# Patient Record
Sex: MALE | URBAN - METROPOLITAN AREA
[De-identification: names, ages, dates, MRNs, and addresses within clinical notes are randomized per-mention and may not be internally consistent; named-entity substitution may affect disease eponyms.]

---

## 2020-06-01 ENCOUNTER — APPOINTMENT (OUTPATIENT)
Age: 25
Setting detail: DERMATOLOGY
End: 2020-06-02

## 2020-06-01 DIAGNOSIS — L80 VITILIGO: ICD-10-CM

## 2020-06-01 PROCEDURE — OTHER TREATMENT REGIMEN: OTHER

## 2020-06-01 PROCEDURE — OTHER MEDICATION COUNSELING: OTHER

## 2020-06-01 PROCEDURE — OTHER PRESCRIPTION: OTHER

## 2020-06-01 PROCEDURE — OTHER COUNSELING: OTHER

## 2020-06-01 PROCEDURE — OTHER ADDITIONAL NOTES: OTHER

## 2020-06-01 PROCEDURE — 99202 OFFICE O/P NEW SF 15 MIN: CPT

## 2020-06-01 RX ORDER — BETAMETHASONE VALERATE 1 MG/G
OINTMENT TOPICAL
Qty: 1 | Refills: 0 | Status: ERX | COMMUNITY
Start: 2020-06-01

## 2020-06-01 RX ORDER — BETAMETHASONE DIPROPIONATE 0.5 MG/G
OINTMENT TOPICAL
Qty: 1 | Refills: 0 | Status: ERX | COMMUNITY
Start: 2020-06-01

## 2020-06-01 ASSESSMENT — LOCATION ZONE DERM
LOCATION ZONE: LIP
LOCATION ZONE: HAND
LOCATION ZONE: FACE

## 2020-06-01 ASSESSMENT — LOCATION DETAILED DESCRIPTION DERM
LOCATION DETAILED: RIGHT UPPER CUTANEOUS LIP
LOCATION DETAILED: RIGHT INFERIOR LATERAL MALAR CHEEK
LOCATION DETAILED: RIGHT RADIAL DORSAL HAND
LOCATION DETAILED: RIGHT SUPERIOR CENTRAL MALAR CHEEK

## 2020-06-01 ASSESSMENT — LOCATION SIMPLE DESCRIPTION DERM
LOCATION SIMPLE: RIGHT CHEEK
LOCATION SIMPLE: RIGHT HAND
LOCATION SIMPLE: RIGHT LIP

## 2020-06-01 NOTE — PROCEDURE: TREATMENT REGIMEN
Detail Level: Detailed
Initiate Treatment: betamethasone dipropionate 0.05 % topical ointment \\nApply to affected areas of the cheek, hand, and neck twice per day for one week, then once per night for one week.\\n\\nbetamethasone valerate 0.1 % topical ointment \\nApply to affected areas around the eyes and groin twice per day for one week, then once per night for one week.

## 2020-06-16 ENCOUNTER — APPOINTMENT (OUTPATIENT)
Dept: URBAN - METROPOLITAN AREA CLINIC 236 | Age: 25
Setting detail: DERMATOLOGY
End: 2020-06-16

## 2020-06-16 DIAGNOSIS — L80 VITILIGO: ICD-10-CM

## 2020-06-16 PROCEDURE — OTHER ADDITIONAL NOTES: OTHER

## 2020-06-16 PROCEDURE — OTHER TREATMENT REGIMEN: OTHER

## 2020-06-16 PROCEDURE — OTHER MEDICATION COUNSELING: OTHER

## 2020-06-16 PROCEDURE — OTHER WOOD'S LAMP: OTHER

## 2020-06-16 PROCEDURE — OTHER PRESCRIPTION: OTHER

## 2020-06-16 PROCEDURE — OTHER COUNSELING: OTHER

## 2020-06-16 PROCEDURE — OTHER ORDER TESTS: OTHER

## 2020-06-16 PROCEDURE — 99213 OFFICE O/P EST LOW 20 MIN: CPT

## 2020-06-16 RX ORDER — CLOBETASOL PROPIONATE 0.5 MG/G
CREAM TOPICAL
Qty: 1 | Refills: 0 | Status: ERX | COMMUNITY
Start: 2020-06-16

## 2020-06-16 ASSESSMENT — LOCATION DETAILED DESCRIPTION DERM
LOCATION DETAILED: 3RD WEB SPACE RIGHT HAND
LOCATION DETAILED: RIGHT UPPER CUTANEOUS LIP
LOCATION DETAILED: RIGHT INFERIOR LATERAL MALAR CHEEK
LOCATION DETAILED: RIGHT SUPERIOR CENTRAL MALAR CHEEK
LOCATION DETAILED: RIGHT LOWER CUTANEOUS LIP
LOCATION DETAILED: RIGHT RADIAL DORSAL HAND

## 2020-06-16 ASSESSMENT — LOCATION SIMPLE DESCRIPTION DERM
LOCATION SIMPLE: RIGHT CHEEK
LOCATION SIMPLE: RIGHT LIP
LOCATION SIMPLE: RIGHT HAND

## 2020-06-16 ASSESSMENT — LOCATION ZONE DERM
LOCATION ZONE: HAND
LOCATION ZONE: LIP
LOCATION ZONE: FACE

## 2020-06-16 NOTE — PROCEDURE: ADDITIONAL NOTES
Additional Notes: Right cheek - 2.75cm x 2.75cm\\nRight canthus - 1.15cm x 1.15cm
Detail Level: Detailed

## 2020-06-16 NOTE — PROCEDURE: TREATMENT REGIMEN
Continue Regimen: betamethasone dipropionate 0.05 % topical ointment \\nApply to affected areas of the cheek, hand, and neck twice per day for one week, then once per night for one week.\\n\\nbetamethasone valerate 0.1 % topical ointment \\nApply to affected areas around the eyes and groin twice per day for one week, then once per night for one week.
Detail Level: Detailed
Initiate Treatment: clobetasol 0.05 % topical cream \\nApply to affected areas of the hands twice per day for two weeks. Repulse after 1 week off.

## 2020-07-14 ENCOUNTER — APPOINTMENT (OUTPATIENT)
Dept: URBAN - METROPOLITAN AREA CLINIC 236 | Age: 25
Setting detail: DERMATOLOGY
End: 2020-07-14

## 2020-07-14 VITALS — TEMPERATURE: 97.9 F

## 2020-07-14 DIAGNOSIS — L80 VITILIGO: ICD-10-CM

## 2020-07-14 PROCEDURE — OTHER LAB REPORTS REVIEWED: OTHER

## 2020-07-14 PROCEDURE — OTHER COUNSELING: OTHER

## 2020-07-14 PROCEDURE — OTHER TREATMENT REGIMEN: OTHER

## 2020-07-14 PROCEDURE — OTHER MEDICATION COUNSELING: OTHER

## 2020-07-14 PROCEDURE — 99213 OFFICE O/P EST LOW 20 MIN: CPT

## 2020-07-14 ASSESSMENT — LOCATION DETAILED DESCRIPTION DERM
LOCATION DETAILED: RIGHT SUPERIOR CENTRAL MALAR CHEEK
LOCATION DETAILED: RIGHT RADIAL DORSAL HAND
LOCATION DETAILED: RIGHT UPPER CUTANEOUS LIP
LOCATION DETAILED: RIGHT INFERIOR LATERAL MALAR CHEEK
LOCATION DETAILED: RIGHT LOWER CUTANEOUS LIP
LOCATION DETAILED: 3RD WEB SPACE RIGHT HAND

## 2020-07-14 ASSESSMENT — LOCATION SIMPLE DESCRIPTION DERM
LOCATION SIMPLE: RIGHT CHEEK
LOCATION SIMPLE: RIGHT HAND
LOCATION SIMPLE: RIGHT LIP

## 2020-07-14 ASSESSMENT — LOCATION ZONE DERM
LOCATION ZONE: LIP
LOCATION ZONE: FACE
LOCATION ZONE: HAND

## 2020-07-14 NOTE — PROCEDURE: TREATMENT REGIMEN
Detail Level: Detailed
Modify Regimen: clobetasol 0.05 % topical cream \\nApply to affected areas of the hands twice per day for two weeks. Repulse after 1 week off.\\n\\nbetamethasone dipropionate 0.05 % topical ointment \\nApply to affected areas of the cheek, hand, and neck twice per day for one week, then once per night for one week.\\n\\nbetamethasone valerate 0.1 % topical ointment \\nApply to affected areas around the eyes and groin twice per day for one week, then once per night for one week.\\n\\nTake off a week then Pulse medications for two weeks then take a one month break

## 2020-09-28 ENCOUNTER — APPOINTMENT (OUTPATIENT)
Dept: URBAN - METROPOLITAN AREA CLINIC 236 | Age: 25
Setting detail: DERMATOLOGY
End: 2020-09-29

## 2020-09-28 DIAGNOSIS — L80 VITILIGO: ICD-10-CM

## 2020-09-28 PROCEDURE — 99213 OFFICE O/P EST LOW 20 MIN: CPT

## 2020-09-28 PROCEDURE — OTHER MEDICATION COUNSELING: OTHER

## 2020-09-28 PROCEDURE — OTHER COUNSELING: OTHER

## 2020-09-28 PROCEDURE — OTHER WOOD'S LAMP: OTHER

## 2020-09-28 PROCEDURE — OTHER TREATMENT REGIMEN: OTHER

## 2020-09-28 ASSESSMENT — LOCATION DETAILED DESCRIPTION DERM
LOCATION DETAILED: RIGHT LOWER CUTANEOUS LIP
LOCATION DETAILED: RIGHT CENTRAL MALAR CHEEK
LOCATION DETAILED: RIGHT SUPERIOR CENTRAL MALAR CHEEK
LOCATION DETAILED: RIGHT RADIAL DORSAL HAND
LOCATION DETAILED: RIGHT UPPER CUTANEOUS LIP
LOCATION DETAILED: RIGHT INFERIOR LATERAL MALAR CHEEK
LOCATION DETAILED: 3RD WEB SPACE RIGHT HAND

## 2020-09-28 ASSESSMENT — LOCATION ZONE DERM
LOCATION ZONE: HAND
LOCATION ZONE: FACE
LOCATION ZONE: LIP

## 2020-09-28 ASSESSMENT — LOCATION SIMPLE DESCRIPTION DERM
LOCATION SIMPLE: RIGHT LIP
LOCATION SIMPLE: RIGHT HAND
LOCATION SIMPLE: RIGHT CHEEK

## 2020-09-28 NOTE — PROCEDURE: TREATMENT REGIMEN
Modify Regimen: clobetasol 0.05 % topical cream \\nApply to affected areas of the hands twice per day for two weeks. Repulse after 1 month off.\\n\\nbetamethasone dipropionate 0.05 % topical ointment \\nApply to affected areas of the cheek, hand, and neck twice per day for one week, then once per night for one week. Repulse after 1 month off.\\n\\nbetamethasone valerate 0.1 % topical ointment \\nApply to affected areas around the eyes and groin twice per day for one week, then once per night for one week. Repulse after 1 month off.
Detail Level: Detailed

## 2020-09-28 NOTE — PROCEDURE: MEDICATION COUNSELING
----- Message from Sarkis Proctor MD sent at 9/25/2020 12:20 PM CDT -----  Continue find out if he so genetic counseling for history of several adenomatous colon polyps, referral was placed last March     Nsaids Counseling: NSAID Counseling: I discussed with the patient that NSAIDs should be taken with food. Prolonged use of NSAIDs can result in the development of stomach ulcers.  Patient advised to stop taking NSAIDs if abdominal pain occurs.  The patient verbalized understanding of the proper use and possible adverse effects of NSAIDs.  All of the patient's questions and concerns were addressed.

## 2022-05-17 NOTE — PROCEDURE: MEDICATION COUNSELING
Detail Level: Detailed Detail Level: Zone [place] Counseling: I discussed with the patient the risks of tildrakizumab including but not limited to immunosuppression, malignancy, posterior leukoencephalopathy syndrome, and serious infections.  The patient understands that monitoring is required including a PPD at baseline and must alert us or the primary physician if symptoms of infection or other concerning signs are noted.

## 2023-03-08 NOTE — PROCEDURE: WOOD'S LAMP
Use Map Statement For Sites (Optional): No
Response To Light: positive for hypopigmentation
Wood's Lamp Text: A Wood's Lamp was used to illuminate areas of the skin with a black light. The light was held over the suspected areas in a darkened room.
Detail Level: Detailed

## 2023-06-26 NOTE — PROCEDURE: MEDICATION COUNSELING
FAMILY HISTORY:  Family history of breast cancer     Eucrisa Counseling: Patient may experience a mild burning sensation during topical application. Eucrisa is not approved in children less than 2 years of age.
